# Patient Record
Sex: FEMALE | Race: WHITE | Employment: OTHER | URBAN - METROPOLITAN AREA
[De-identification: names, ages, dates, MRNs, and addresses within clinical notes are randomized per-mention and may not be internally consistent; named-entity substitution may affect disease eponyms.]

---

## 2019-02-18 ENCOUNTER — APPOINTMENT (OUTPATIENT)
Dept: GENERAL RADIOLOGY | Facility: HOSPITAL | Age: 84
DRG: 064 | End: 2019-02-18
Attending: EMERGENCY MEDICINE
Payer: MEDICARE

## 2019-02-18 ENCOUNTER — HOSPITAL ENCOUNTER (INPATIENT)
Facility: HOSPITAL | Age: 84
LOS: 3 days | Discharge: NURSING, REHAB OR LONG-TERM CARE | DRG: 064 | End: 2019-02-21
Attending: EMERGENCY MEDICINE | Admitting: FAMILY MEDICINE
Payer: MEDICARE

## 2019-02-18 DIAGNOSIS — N30.90 CYSTITIS: ICD-10-CM

## 2019-02-18 DIAGNOSIS — R53.1 WEAKNESS: Primary | ICD-10-CM

## 2019-02-18 DIAGNOSIS — E86.0 DEHYDRATION: ICD-10-CM

## 2019-02-18 LAB
ANION GAP SERPL CALC-SCNC: 8 MMOL/L (ref 0–18)
BASOPHILS # BLD AUTO: 0.04 X10(3) UL (ref 0–0.2)
BASOPHILS NFR BLD AUTO: 0.5 %
BILIRUB UR QL: NEGATIVE
BUN BLD-MCNC: 39 MG/DL (ref 7–18)
BUN/CREAT SERPL: 22.8 (ref 10–20)
CALCIUM BLD-MCNC: 9.2 MG/DL (ref 8.5–10.1)
CHLORIDE SERPL-SCNC: 107 MMOL/L (ref 98–107)
CO2 SERPL-SCNC: 26 MMOL/L (ref 21–32)
COLOR UR: YELLOW
CREAT BLD-MCNC: 1.71 MG/DL (ref 0.55–1.02)
DEPRECATED RDW RBC AUTO: 46.7 FL (ref 35.1–46.3)
EOSINOPHIL # BLD AUTO: 0.08 X10(3) UL (ref 0–0.7)
EOSINOPHIL NFR BLD AUTO: 1 %
ERYTHROCYTE [DISTWIDTH] IN BLOOD BY AUTOMATED COUNT: 13.2 % (ref 11–15)
GLUCOSE BLD-MCNC: 99 MG/DL (ref 70–99)
GLUCOSE UR-MCNC: NEGATIVE MG/DL
HCT VFR BLD AUTO: 41.1 % (ref 35–48)
HGB BLD-MCNC: 13.2 G/DL (ref 12–16)
IMM GRANULOCYTES # BLD AUTO: 0.04 X10(3) UL (ref 0–1)
IMM GRANULOCYTES NFR BLD: 0.5 %
KETONES UR-MCNC: NEGATIVE MG/DL
LYMPHOCYTES # BLD AUTO: 1.26 X10(3) UL (ref 1–4)
LYMPHOCYTES NFR BLD AUTO: 15.7 %
MCH RBC QN AUTO: 30.8 PG (ref 26–34)
MCHC RBC AUTO-ENTMCNC: 32.1 G/DL (ref 31–37)
MCV RBC AUTO: 96 FL (ref 80–100)
MONOCYTES # BLD AUTO: 0.36 X10(3) UL (ref 0.1–1)
MONOCYTES NFR BLD AUTO: 4.5 %
NEUTROPHILS # BLD AUTO: 6.24 X10 (3) UL (ref 1.5–7.7)
NEUTROPHILS # BLD AUTO: 6.24 X10(3) UL (ref 1.5–7.7)
NEUTROPHILS NFR BLD AUTO: 77.8 %
NITRITE UR QL STRIP.AUTO: NEGATIVE
OSMOLALITY SERPL CALC.SUM OF ELEC: 301 MOSM/KG (ref 275–295)
PH UR: 7 [PH] (ref 5–8)
PLATELET # BLD AUTO: 231 10(3)UL (ref 150–450)
POTASSIUM SERPL-SCNC: 3.9 MMOL/L (ref 3.5–5.1)
PROT UR-MCNC: 30 MG/DL
RBC # BLD AUTO: 4.28 X10(6)UL (ref 3.8–5.3)
RBC #/AREA URNS AUTO: <1 /HPF
SODIUM SERPL-SCNC: 141 MMOL/L (ref 136–145)
SP GR UR STRIP: 1.01 (ref 1–1.03)
UROBILINOGEN UR STRIP-ACNC: <2
VIT C UR-MCNC: NEGATIVE MG/DL
WBC # BLD AUTO: 8 X10(3) UL (ref 4–11)
WBC #/AREA URNS AUTO: 7 /HPF

## 2019-02-18 PROCEDURE — 80048 BASIC METABOLIC PNL TOTAL CA: CPT | Performed by: EMERGENCY MEDICINE

## 2019-02-18 PROCEDURE — 85025 COMPLETE CBC W/AUTO DIFF WBC: CPT | Performed by: EMERGENCY MEDICINE

## 2019-02-18 PROCEDURE — 99285 EMERGENCY DEPT VISIT HI MDM: CPT

## 2019-02-18 PROCEDURE — 81001 URINALYSIS AUTO W/SCOPE: CPT | Performed by: EMERGENCY MEDICINE

## 2019-02-18 PROCEDURE — 87186 SC STD MICRODIL/AGAR DIL: CPT | Performed by: EMERGENCY MEDICINE

## 2019-02-18 PROCEDURE — 36415 COLL VENOUS BLD VENIPUNCTURE: CPT

## 2019-02-18 PROCEDURE — 87086 URINE CULTURE/COLONY COUNT: CPT | Performed by: EMERGENCY MEDICINE

## 2019-02-18 PROCEDURE — 87088 URINE BACTERIA CULTURE: CPT | Performed by: EMERGENCY MEDICINE

## 2019-02-18 PROCEDURE — 72100 X-RAY EXAM L-S SPINE 2/3 VWS: CPT | Performed by: EMERGENCY MEDICINE

## 2019-02-18 RX ORDER — HEPARIN SODIUM 5000 [USP'U]/ML
5000 INJECTION, SOLUTION INTRAVENOUS; SUBCUTANEOUS EVERY 12 HOURS SCHEDULED
Status: DISCONTINUED | OUTPATIENT
Start: 2019-02-18 | End: 2019-02-20

## 2019-02-18 RX ORDER — 0.9 % SODIUM CHLORIDE 0.9 %
10 VIAL (ML) INJECTION AS NEEDED
Status: DISCONTINUED | OUTPATIENT
Start: 2019-02-18 | End: 2019-02-21

## 2019-02-18 RX ORDER — ATORVASTATIN CALCIUM 10 MG/1
10 TABLET, FILM COATED ORAL NIGHTLY
Status: DISCONTINUED | OUTPATIENT
Start: 2019-02-18 | End: 2019-02-21

## 2019-02-18 RX ORDER — SODIUM CHLORIDE 9 MG/ML
125 INJECTION, SOLUTION INTRAVENOUS CONTINUOUS
Status: DISCONTINUED | OUTPATIENT
Start: 2019-02-18 | End: 2019-02-18

## 2019-02-18 RX ORDER — MELATONIN
1000 DAILY
COMMUNITY

## 2019-02-18 RX ORDER — LOSARTAN POTASSIUM 50 MG/1
50 TABLET ORAL DAILY
Status: DISCONTINUED | OUTPATIENT
Start: 2019-02-18 | End: 2019-02-21

## 2019-02-18 RX ORDER — LOSARTAN POTASSIUM 50 MG/1
50 TABLET ORAL DAILY
COMMUNITY
End: 2019-03-14

## 2019-02-18 RX ORDER — ASPIRIN 325 MG
325 TABLET ORAL DAILY
Status: ON HOLD | COMMUNITY
End: 2019-02-21

## 2019-02-18 RX ORDER — CHOLECALCIFEROL (VITAMIN D3) 125 MCG
1000 CAPSULE ORAL DAILY
Status: DISCONTINUED | OUTPATIENT
Start: 2019-02-18 | End: 2019-02-21

## 2019-02-18 RX ORDER — SODIUM CHLORIDE 9 MG/ML
INJECTION, SOLUTION INTRAVENOUS CONTINUOUS
Status: DISCONTINUED | OUTPATIENT
Start: 2019-02-18 | End: 2019-02-20

## 2019-02-18 RX ORDER — ATORVASTATIN CALCIUM 10 MG/1
10 TABLET, FILM COATED ORAL NIGHTLY
COMMUNITY

## 2019-02-18 RX ORDER — ASPIRIN 325 MG
325 TABLET ORAL DAILY
Status: DISCONTINUED | OUTPATIENT
Start: 2019-02-18 | End: 2019-02-21

## 2019-02-18 NOTE — ED NOTES
Orders for admission, patient is aware of plan and ready to go upstairs. Any questions, please call ED RN Delmy  at extension [phone].

## 2019-02-18 NOTE — ED PROVIDER NOTES
Patient Seen in: [facility] AND [facility] Emergency Department    History   No chief complaint on file. Stated Complaint: Back pain     HPI    Patient is an 26-year-old female who was brought to the ER by her family. She complains of back pain.   Family stat Oriented to person, place, and time. Appears frail. [place] HEENT:   Head: Normocephalic and atraumatic.    Right Ear: External ear normal.   Left Ear: External ear normal.   Nose: Nose normal.   Mouth/Throat: Oropharynx is clear and moist.   Eyes: Conjunctivae Narrative: The following orders were created for panel order CBC WITH DIFFERENTIAL WITH PLATELET.   Procedure                               Abnormality         Status                     ---------                               -----------         ------

## 2019-02-19 ENCOUNTER — APPOINTMENT (OUTPATIENT)
Dept: MRI IMAGING | Facility: HOSPITAL | Age: 84
DRG: 064 | End: 2019-02-19
Attending: Other
Payer: MEDICARE

## 2019-02-19 ENCOUNTER — APPOINTMENT (OUTPATIENT)
Dept: ULTRASOUND IMAGING | Facility: HOSPITAL | Age: 84
DRG: 064 | End: 2019-02-19
Attending: FAMILY MEDICINE
Payer: MEDICARE

## 2019-02-19 LAB
ALBUMIN SERPL-MCNC: 2.3 G/DL (ref 3.4–5)
ALBUMIN/GLOB SERPL: 0.6 {RATIO} (ref 1–2)
ALP LIVER SERPL-CCNC: 198 U/L (ref 55–142)
ALT SERPL-CCNC: 23 U/L (ref 13–56)
ANION GAP SERPL CALC-SCNC: 8 MMOL/L (ref 0–18)
AST SERPL-CCNC: 63 U/L (ref 15–37)
BASOPHILS # BLD AUTO: 0.06 X10(3) UL (ref 0–0.2)
BASOPHILS NFR BLD AUTO: 0.9 %
BILIRUB SERPL-MCNC: 0.6 MG/DL (ref 0.1–2)
BUN BLD-MCNC: 37 MG/DL (ref 7–18)
BUN/CREAT SERPL: 25.5 (ref 10–20)
CALCIUM BLD-MCNC: 8.5 MG/DL (ref 8.5–10.1)
CHLORIDE SERPL-SCNC: 109 MMOL/L (ref 98–107)
CHOLEST SMN-MCNC: 139 MG/DL (ref ?–200)
CO2 SERPL-SCNC: 24 MMOL/L (ref 21–32)
CREAT BLD-MCNC: 1.45 MG/DL (ref 0.55–1.02)
DEPRECATED RDW RBC AUTO: 47.5 FL (ref 35.1–46.3)
EOSINOPHIL # BLD AUTO: 0.12 X10(3) UL (ref 0–0.7)
EOSINOPHIL NFR BLD AUTO: 1.7 %
ERYTHROCYTE [DISTWIDTH] IN BLOOD BY AUTOMATED COUNT: 13.3 % (ref 11–15)
GLOBULIN PLAS-MCNC: 4.1 G/DL (ref 2.8–4.4)
GLUCOSE BLD-MCNC: 67 MG/DL (ref 70–99)
HCT VFR BLD AUTO: 36.2 % (ref 35–48)
HDLC SERPL-MCNC: 41 MG/DL (ref 40–59)
HGB BLD-MCNC: 11.4 G/DL (ref 12–16)
IMM GRANULOCYTES # BLD AUTO: 0.06 X10(3) UL (ref 0–1)
IMM GRANULOCYTES NFR BLD: 0.9 %
LDLC SERPL CALC-MCNC: 75 MG/DL (ref ?–100)
LYMPHOCYTES # BLD AUTO: 1.33 X10(3) UL (ref 1–4)
LYMPHOCYTES NFR BLD AUTO: 19.4 %
M PROTEIN MFR SERPL ELPH: 6.4 G/DL (ref 6.4–8.2)
MCH RBC QN AUTO: 30.5 PG (ref 26–34)
MCHC RBC AUTO-ENTMCNC: 31.5 G/DL (ref 31–37)
MCV RBC AUTO: 96.8 FL (ref 80–100)
MONOCYTES # BLD AUTO: 0.43 X10(3) UL (ref 0.1–1)
MONOCYTES NFR BLD AUTO: 6.3 %
NEUTROPHILS # BLD AUTO: 4.86 X10 (3) UL (ref 1.5–7.7)
NEUTROPHILS # BLD AUTO: 4.86 X10(3) UL (ref 1.5–7.7)
NEUTROPHILS NFR BLD AUTO: 70.8 %
NONHDLC SERPL-MCNC: 98 MG/DL (ref ?–130)
OSMOLALITY SERPL CALC.SUM OF ELEC: 299 MOSM/KG (ref 275–295)
PLATELET # BLD AUTO: 216 10(3)UL (ref 150–450)
POTASSIUM SERPL-SCNC: 3.7 MMOL/L (ref 3.5–5.1)
RBC # BLD AUTO: 3.74 X10(6)UL (ref 3.8–5.3)
SODIUM SERPL-SCNC: 141 MMOL/L (ref 136–145)
T4 FREE SERPL-MCNC: 1.2 NG/DL (ref 0.8–1.7)
TRIGL SERPL-MCNC: 113 MG/DL (ref 30–149)
TSI SER-ACNC: 3.92 MIU/ML (ref 0.36–3.74)
VIT B12 SERPL-MCNC: >2000 PG/ML (ref 193–986)
WBC # BLD AUTO: 6.9 X10(3) UL (ref 4–11)

## 2019-02-19 PROCEDURE — 97530 THERAPEUTIC ACTIVITIES: CPT

## 2019-02-19 PROCEDURE — 82306 VITAMIN D 25 HYDROXY: CPT | Performed by: FAMILY MEDICINE

## 2019-02-19 PROCEDURE — 72148 MRI LUMBAR SPINE W/O DYE: CPT | Performed by: OTHER

## 2019-02-19 PROCEDURE — 76770 US EXAM ABDO BACK WALL COMP: CPT | Performed by: FAMILY MEDICINE

## 2019-02-19 PROCEDURE — 82607 VITAMIN B-12: CPT | Performed by: FAMILY MEDICINE

## 2019-02-19 PROCEDURE — 80053 COMPREHEN METABOLIC PANEL: CPT | Performed by: FAMILY MEDICINE

## 2019-02-19 PROCEDURE — 97162 PT EVAL MOD COMPLEX 30 MIN: CPT

## 2019-02-19 PROCEDURE — 80061 LIPID PANEL: CPT | Performed by: FAMILY MEDICINE

## 2019-02-19 PROCEDURE — 97535 SELF CARE MNGMENT TRAINING: CPT

## 2019-02-19 PROCEDURE — 72146 MRI CHEST SPINE W/O DYE: CPT | Performed by: OTHER

## 2019-02-19 PROCEDURE — 85025 COMPLETE CBC W/AUTO DIFF WBC: CPT | Performed by: FAMILY MEDICINE

## 2019-02-19 PROCEDURE — 97116 GAIT TRAINING THERAPY: CPT

## 2019-02-19 PROCEDURE — 70551 MRI BRAIN STEM W/O DYE: CPT | Performed by: OTHER

## 2019-02-19 PROCEDURE — 84443 ASSAY THYROID STIM HORMONE: CPT | Performed by: FAMILY MEDICINE

## 2019-02-19 PROCEDURE — 84439 ASSAY OF FREE THYROXINE: CPT | Performed by: FAMILY MEDICINE

## 2019-02-19 PROCEDURE — 97166 OT EVAL MOD COMPLEX 45 MIN: CPT

## 2019-02-19 RX ORDER — ACETAMINOPHEN 500 MG
500 TABLET ORAL EVERY 6 HOURS PRN
Status: DISCONTINUED | OUTPATIENT
Start: 2019-02-19 | End: 2019-02-21

## 2019-02-19 RX ORDER — TRAMADOL HYDROCHLORIDE 50 MG/1
50 TABLET ORAL EVERY 12 HOURS PRN
Status: DISCONTINUED | OUTPATIENT
Start: 2019-02-19 | End: 2019-02-21

## 2019-02-19 NOTE — H&P
[facility]    PATIENT'S NAME: Emeli Morales PHYSICIAN: Ale Salazar MD   PATIENT ACCOUNT#:   [id number]    LOCATION:  [address] [place] [address] Dr. Ferrara [facility] RECORD #:   [id number]       YOB: 1934  ADMISSION DATE:       02/18/20 movements were intact. Mucosa was dry. NECK:  No masses. LUNGS:  Clear to auscultation and percussion. HEART:  Regular rate and rhythm. S1 and S2. No murmurs. EXTREMITIES:  No cyanosis, clubbing, or edema. ABDOMEN:  Positive bowel sounds.   Soft, no

## 2019-02-19 NOTE — PLAN OF CARE
Problem: Patient Centered Care  Goal: Patient preferences are identified and integrated in the patient's plan of care  Interventions:  - What would you like us to know as we care for you?  I live at home with my   - Provide timely, complete, and accu MUSCULOSKELETAL - ADULT  Goal: Return mobility to safest level of function  INTERVENTIONS:  - Assess patient stability and activity tolerance for standing, transferring and ambulating w/ or w/o assistive devices  - Assist with transfers and ambulation usin

## 2019-02-19 NOTE — PROGRESS NOTES
[facility]    Progress Note    Isabel Rodriguez Patient Status:  Inpatient    1934 MRN [id number]   Location [facility] 5SW/SE Attending Francesca Montgomery MD   Hosp Day # 1 PCP Mayank Prieto MD       Subjective:   Isabel Rodriguez Specifically, no evidence of acute or early subacute infarction. 2. 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  There are additional chronic bilateral cerebellar lacun Abdominal Aorta Complete  (cpt=76770)    Result Date: 2/19/2019  CONCLUSION:  1.  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

## 2019-02-19 NOTE — CONSULTS
02/18/19  1615 02/18/19  1759 02/18/19  1801 02/18/19  2110   BP: 135/69  152/73 130/72   Pulse: 90  102 94   Resp: 18 18 18   Temp:   97.3 °F (36.3 °C) 97.6 °F (36.4 °C)   TempSrc:   Oral Oral   SpO2: 96%  100% 98%   Weight:       Height:  5' 6\" (1.676 External ear normal.   Nose: Nose normal.   Mouth/Throat: Oropharynx is clear and moist.   Eyes: Conjunctivae and EOM are normal. Pupils are equal, round, and reactive to light. Neck: Neck supple.    Back exam there is no specific point tenderness she peguero

## 2019-02-19 NOTE — CM/SW NOTE
Sw was notified by RN that recommendations are for SNF and family is requesting SNF list    SW met w/ pt to d/c. Pt states she lives at home w/  and \"some children\". Pt reported there is an elevator in her building to use.  Pt reported no DME/servi

## 2019-02-19 NOTE — PHYSICAL THERAPY NOTE
PHYSICAL THERAPY EVALUATION - INPATIENT     Room Number: 553/553-A  Evaluation Date: 2/19/2019  Type of Evaluation: Initial   Physician Order: PT Eval and Treat    Presenting Problem: weakness,cystitis and dehydration  Reason for Therapy: Mobility Dysfunc training;Neuromuscular re-educate;Strengthening;Transfer training;Balance training  Rehab Potential : Good  Frequency (Obs): 5x/week       PHYSICAL THERAPY MEDICAL/SOCIAL HISTORY     History related to current admission:   Brought to the ER by her family. +  Dynamic Standing: Poor    AD       AM-PAC '6-Clicks' INPATIENT SHORT FORM - BASIC MOBILITY  How much difficulty does the patient currently have. ..  -   Turning over in bed (including adjusting bedclothes, sheets and blankets)?: A Little   -   Sitting do Current Status    Goal #5 Patient to demonstrate independence with home activity/exercise instructions provided to patient in preparation for discharge.    Goal #5   Current Status    Goal #6    Goal #6  Current Status

## 2019-02-19 NOTE — PLAN OF CARE
Problem: Patient Centered Care  Goal: Patient preferences are identified and integrated in the patient's plan of care  Interventions:  - What would you like us to know as we care for you?  I live at home with my   - Provide timely, complete, and accu Patient is alert and oriented. No complaints of pain. Family at bedside asked to bring a list of patient's medication. Will continue to monitor.

## 2019-02-19 NOTE — OCCUPATIONAL THERAPY NOTE
OCCUPATIONAL THERAPY EVALUATION - INPATIENT     Room Number: 553/553-A  Evaluation Date: 2/19/2019  Type of Evaluation: Initial  Presenting Problem: (weakness)    Physician Order: IP Consult to Occupational Therapy  Reason for Therapy: ADL/IADL Dysfunction to [facility] chair and set up with alarm, call light, legs elevated. Discussed role of OT in acute care setting, recommendation of rehab placement and family goals, family verbalized understanding.     The patient's Approx Degree of Impairment: 56.46% has been vikas SUBJECTIVE  Patient agreeable to therapy     OCCUPATIONAL THERAPY EXAMINATION      OBJECTIVE  Precautions: Bed/chair alarm  Fall Risk: High fall risk    PAIN ASSESSMENT  Ratin          ACTIVITY TOLERANCE  Pt engages in fx mobility/tfrs and ADLs thi 56.46%  Standardized Score (AM-PAC Scale): 34.69  CMS Modifier (G-Code): CK    FUNCTIONAL TRANSFER ASSESSMENT  Supine to Sit : Minimum assistance  Sit to Stand:  Moderate assistance  Toilet Transfer: mod a   Shower Transfer: nt  Chair Transfer: mod a     Be

## 2019-02-20 ENCOUNTER — APPOINTMENT (OUTPATIENT)
Dept: ULTRASOUND IMAGING | Facility: HOSPITAL | Age: 84
DRG: 064 | End: 2019-02-20
Attending: FAMILY MEDICINE
Payer: MEDICARE

## 2019-02-20 LAB
25(OH)D3 SERPL-MCNC: 18.2 NG/ML (ref 30–100)
ANION GAP SERPL CALC-SCNC: 6 MMOL/L (ref 0–18)
BASOPHILS # BLD AUTO: 0.06 X10(3) UL (ref 0–0.2)
BASOPHILS NFR BLD AUTO: 0.8 %
BUN BLD-MCNC: 28 MG/DL (ref 7–18)
BUN/CREAT SERPL: 23.9 (ref 10–20)
CALCIUM BLD-MCNC: 8.5 MG/DL (ref 8.5–10.1)
CHLORIDE SERPL-SCNC: 111 MMOL/L (ref 98–107)
CO2 SERPL-SCNC: 24 MMOL/L (ref 21–32)
CREAT BLD-MCNC: 1.17 MG/DL (ref 0.55–1.02)
DEPRECATED RDW RBC AUTO: 46.5 FL (ref 35.1–46.3)
EOSINOPHIL # BLD AUTO: 0.16 X10(3) UL (ref 0–0.7)
EOSINOPHIL NFR BLD AUTO: 2.2 %
ERYTHROCYTE [DISTWIDTH] IN BLOOD BY AUTOMATED COUNT: 13.1 % (ref 11–15)
GLUCOSE BLD-MCNC: 74 MG/DL (ref 70–99)
HCT VFR BLD AUTO: 37 % (ref 35–48)
HGB BLD-MCNC: 11.8 G/DL (ref 12–16)
IMM GRANULOCYTES # BLD AUTO: 0.05 X10(3) UL (ref 0–1)
IMM GRANULOCYTES NFR BLD: 0.7 %
LYMPHOCYTES # BLD AUTO: 1.15 X10(3) UL (ref 1–4)
LYMPHOCYTES NFR BLD AUTO: 15.6 %
MCH RBC QN AUTO: 31.3 PG (ref 26–34)
MCHC RBC AUTO-ENTMCNC: 31.9 G/DL (ref 31–37)
MCV RBC AUTO: 98.1 FL (ref 80–100)
MONOCYTES # BLD AUTO: 0.36 X10(3) UL (ref 0.1–1)
MONOCYTES NFR BLD AUTO: 4.9 %
NEUTROPHILS # BLD AUTO: 5.6 X10 (3) UL (ref 1.5–7.7)
NEUTROPHILS # BLD AUTO: 5.6 X10(3) UL (ref 1.5–7.7)
NEUTROPHILS NFR BLD AUTO: 75.8 %
OSMOLALITY SERPL CALC.SUM OF ELEC: 296 MOSM/KG (ref 275–295)
PLATELET # BLD AUTO: 216 10(3)UL (ref 150–450)
POTASSIUM SERPL-SCNC: 3.5 MMOL/L (ref 3.5–5.1)
RBC # BLD AUTO: 3.77 X10(6)UL (ref 3.8–5.3)
SODIUM SERPL-SCNC: 141 MMOL/L (ref 136–145)
WBC # BLD AUTO: 7.4 X10(3) UL (ref 4–11)

## 2019-02-20 PROCEDURE — 85025 COMPLETE CBC W/AUTO DIFF WBC: CPT | Performed by: FAMILY MEDICINE

## 2019-02-20 PROCEDURE — 76705 ECHO EXAM OF ABDOMEN: CPT | Performed by: FAMILY MEDICINE

## 2019-02-20 PROCEDURE — 80048 BASIC METABOLIC PNL TOTAL CA: CPT | Performed by: FAMILY MEDICINE

## 2019-02-20 RX ORDER — DEXTROSE, SODIUM CHLORIDE, AND POTASSIUM CHLORIDE 5; .9; .15 G/100ML; G/100ML; G/100ML
INJECTION INTRAVENOUS CONTINUOUS
Status: DISCONTINUED | OUTPATIENT
Start: 2019-02-20 | End: 2019-02-21

## 2019-02-20 NOTE — PROGRESS NOTES
Notified Dr. Yeison Dick Potassium level asked if he wanted to initiate protocol, he did not want it at this time.

## 2019-02-20 NOTE — PROGRESS NOTES
02/19/19  0802 02/19/19  1215 02/19/19  1530 02/19/19 2009   BP: 139/71 127/72 133/61 119/68   Pulse: 87 74 81 82   Resp: 18 18 18 18   Temp: 98.1 °F (36.7 °C) 98.2 °F (36.8 °C) 98 °F (36.7 °C) 98.4 °F (36.9 °C)   TempSrc: Oral Oral Oral Oral   SpO2: 96%

## 2019-02-20 NOTE — PROGRESS NOTES
[facility]    Progress Note    Oracio Blas Patient Status:  Inpatient    1934 MRN [id number]   Location [facility] [place] 5SW/SE Attending Cathy Tolentino MD   Hosp Day # 2 PCP Jacki Ardon MD       Subjective:   Oracio Blas TSH 3.920 (H) 02/19/2019    B12 >2,000 (H) 02/19/2019       No procedure found. Us Liver (cpt=76705)    Result Date: 2/20/2019  CONCLUSION:  1. Very limited study. 2. Diffusely abnormal echotexture of the liver.  3. At least 2 echogenic densities in t at T5 and T6. 3. Scattered mild thoracic spine degenerative changes. No high-grade neural compromise accounting for degree of motion limitation. 4. Moderate thoracic dextroscoliosis.  5. Probable bilateral renal cysts, incompletely characterized on this non moderate aneurysmal dilatation of the mid descending abdominal aorta measuring 4.0 x 3.0 cm in size as discussed with the moderate amount of peripheral thrombus noted anteriorly. Correlate clinically.  Followup CT scanning with contrast would be of help to

## 2019-02-20 NOTE — CM/SW NOTE
SW was notified that pt/family requesting [address] SNF.  SW placed referral.    [phone]

## 2019-02-20 NOTE — PROGRESS NOTES
This RN was not aware pharmacy had changed patients dose to 1 gm, RN thought she entered order in wrong so changed the medication back to 500 mg. Spoke with pharmacist to fix dosing back to 1 gm per patient needs/dosing.

## 2019-02-20 NOTE — PLAN OF CARE
Problem: Patient/Family Goals  Goal: Patient/Family Long Term Goal  Patient's Long Term Goal: to go home    Interventions:  -follow MD instruction  -Monitor Labs  -Monitor VS  - See additional Care Plan goals for specific interventions   Outcome: Progressi given PRN for back pain. MD notified that patient wishes to have DNR code status. Will continue to monitor.

## 2019-02-20 NOTE — DIETARY NOTE
ADULT NUTRITION INITIAL ASSESSMENT    Pt is at high nutrition risk. Pt meets malnutrition criteria.       CRITERIA FOR MALNUTRITION DIAGNOSIS:  Criteria for severe malnutrition diagnosis: social/environmental related to body fat severe depletion and muscle determined    ADMITTING DIAGNOSIS:   Dehydration [E86.0]  Weakness [R53.1]  Cystitis [N30.90]    PERTINENT PAST MEDICAL HISTORY:   Past Medical History:   Diagnosis Date   • Back pain        ANTHROPOMETRICS:  HT: 167.6 cm (5' 6\")  WT: 49.9 kg (110 lb)   B (1-1.5 grams protein per kg Actual body wt (ABW))    MONITOR AND EVALUATE/NUTRITION GOALS:  - Food and Nutrient Intake:      Monitor: adequacy of PO intake and adequacy of supplement intake.     - Food and Nutrient Administration:      Monitor: need for nut

## 2019-02-20 NOTE — PLAN OF CARE
Problem: Patient Centered Care  Goal: Patient preferences are identified and integrated in the patient's plan of care  Interventions:  - What would you like us to know as we care for you?  I live at home with my   - Provide timely, complete, and accu strengthening/mobility  - Encourage toileting schedule  Outcome: Progressing  Call light and belongings within reach, up in the chair at meal times (attempting to)    Problem: MUSCULOSKELETAL - ADULT  Goal: Return mobility to safest level of function  INTE

## 2019-02-20 NOTE — PROGRESS NOTES
[facility] Pharmacy Note:  Renal Adjustment for Ampicillin     Frandy Walters is a 80year old female who has been prescribed ampicillin 500 mg every 4 hrs. CrCl is estimated creatinine clearance is 28.2 mL/min (A) (based on SCr of 1.17 mg/dL (H)).  The dose has b

## 2019-02-21 VITALS
TEMPERATURE: 98 F | RESPIRATION RATE: 18 BRPM | WEIGHT: 110 LBS | SYSTOLIC BLOOD PRESSURE: 158 MMHG | BODY MASS INDEX: 17.68 KG/M2 | HEART RATE: 93 BPM | HEIGHT: 66 IN | DIASTOLIC BLOOD PRESSURE: 86 MMHG | OXYGEN SATURATION: 96 %

## 2019-02-21 RX ORDER — SODIUM CHLORIDE 9 MG/ML
INJECTION, SOLUTION INTRAVENOUS
Status: COMPLETED
Start: 2019-02-21 | End: 2019-02-21

## 2019-02-21 RX ORDER — ACETAMINOPHEN 500 MG
500 TABLET ORAL EVERY 6 HOURS PRN
Qty: 60 TABLET | Refills: 0 | Status: SHIPPED | OUTPATIENT
Start: 2019-02-21

## 2019-02-21 RX ORDER — CIPROFLOXACIN 250 MG/1
250 TABLET, FILM COATED ORAL 2 TIMES DAILY
Qty: 14 TABLET | Refills: 0 | Status: SHIPPED | OUTPATIENT
Start: 2019-02-21 | End: 2019-02-28

## 2019-02-21 NOTE — PROGRESS NOTES
[facility]    Progress Note    Oracio Blas Patient Status:  Inpatient    1934 MRN [id number]   Location [facility] 5SW/SE Attending Cathy Tolentino MD   Hosp Day # 3 PCP Jacki Ardon MD       Subjective:   Oracio Blas 02/20/2019    .0 02/20/2019    CREATSERUM 1.17 (H) 02/20/2019    BUN 28 (H) 02/20/2019     02/20/2019    K 3.5 02/20/2019     (H) 02/20/2019    CO2 24.0 02/20/2019    GLU 74 02/20/2019    CA 8.5 02/20/2019    ALB 2.3 (L) 02/19/2019    AL

## 2019-02-21 NOTE — PROGRESS NOTES
02/20/19  0549 02/20/19  0754 02/20/19  1632 02/20/19 2006   BP: 156/80 143/67 141/75 124/58   Pulse: 78 83 86 103   Resp: 16 18 18 16   Temp: 97.5 °F (36.4 °C) 97.7 °F (36.5 °C) 98.2 °F (36.8 °C) 98.2 °F (36.8 °C)   TempSrc: Axillary Oral Oral Oral   Sp

## 2019-02-21 NOTE — CM/SW NOTE
Pt able to d/c today. Family requesting Medicar. SW notified [facility]; agreeable w/ 2p d/c time  SW notified RN  Pt/family aware of d/c time and Medicar (51$)  JEFFREY contacted [facility] for [address] PCS form on chart.     [facility] # [phone]    Matt Thompson,

## 2019-02-21 NOTE — CDS QUERY
Potential for Impaired Nutritional Status  DOCUMENTATION CLARIFICATION FORM  Dear Doctor:  Clinical information suggests potential for impaired nutritional status.  For accurate ICD-10-CM code assignment to reflect severity of illness and risk of mortality, INTERVENTION:  - RD Malnutrition Care Plan: Encouraged increased PO intake, Initiated ONS (oral nutritional supplements) and Recommend appetitie stimulant     - Meals and snacks: Encouraged adequate PO intake     - Medical Food Supplements-RD changed suppl

## 2019-02-21 NOTE — PLAN OF CARE
Problem: Patient Centered Care  Goal: Patient preferences are identified and integrated in the patient's plan of care  Interventions:  - What would you like us to know as we care for you?  I live at home with my   - Provide timely, complete, and accu activity based on assessment  - Modify environment to reduce risk of injury  - Provide assistive devices as appropriate  - Consider OT/PT consult to assist with strengthening/mobility  - Encourage toileting schedule  Outcome: Progressing      Problem: MUSC

## 2019-02-21 NOTE — DISCHARGE SUMMARY
[facility]    PATIENT'S NAME: Dottie Castrejon PHYSICIAN: Ede Gomes MD   PATIENT ACCOUNT#:   [id number]    LOCATION:  [address] [place] [address] Dr. Ferrara [facility] RECORD #:   [id number]       YOB: 1934  ADMISSION DATE:       02/18/20 pain.  No cough. No fevers. No leg pain. No sore throat. No headaches. No dysuria. No rashes. No melena, no hematochezia.       PHYSICAL EXAMINATION:    VITAL SIGNS:  At the time of admission blood pressure 152/73, pulse 102, respirations 18, tempera

## 2019-02-21 NOTE — PLAN OF CARE
Problem: Patient Centered Care  Goal: Patient preferences are identified and integrated in the patient's plan of care  Interventions:  - What would you like us to know as we care for you?  I live at home with my   - Provide timely, complete, and accu schedule  Outcome: Adequate for Discharge      Problem: MUSCULOSKELETAL - ADULT  Goal: Return mobility to safest level of function  INTERVENTIONS:  - Assess patient stability and activity tolerance for standing, transferring and ambulating w/ or w/o assist

## 2019-02-25 ENCOUNTER — INITIAL APN SNF VISIT (OUTPATIENT)
Dept: INTERNAL MEDICINE CLINIC | Facility: SKILLED NURSING FACILITY | Age: 84
End: 2019-02-25

## 2019-02-25 DIAGNOSIS — R63.8 DECREASED ORAL INTAKE: ICD-10-CM

## 2019-02-25 DIAGNOSIS — N30.90 CYSTITIS: ICD-10-CM

## 2019-02-25 DIAGNOSIS — E46 MALNUTRITION, UNSPECIFIED TYPE (HCC): ICD-10-CM

## 2019-02-25 DIAGNOSIS — I95.1 ORTHOSTATIC HYPOTENSION: ICD-10-CM

## 2019-02-25 DIAGNOSIS — E86.0 DEHYDRATION: ICD-10-CM

## 2019-02-25 DIAGNOSIS — R53.1 WEAKNESS: ICD-10-CM

## 2019-02-25 PROCEDURE — 99310 SBSQ NF CARE HIGH MDM 45: CPT | Performed by: CLINICAL NURSE SPECIALIST

## 2019-02-26 ENCOUNTER — SNF VISIT (OUTPATIENT)
Dept: INTERNAL MEDICINE CLINIC | Facility: SKILLED NURSING FACILITY | Age: 84
End: 2019-02-26

## 2019-02-26 DIAGNOSIS — Z09 FOLLOW UP: ICD-10-CM

## 2019-02-26 DIAGNOSIS — I95.1 ORTHOSTATIC HYPOTENSION: ICD-10-CM

## 2019-02-26 DIAGNOSIS — Z51.89 ENCOUNTER FOR MEDICATION ADJUSTMENT: ICD-10-CM

## 2019-02-26 PROCEDURE — 99309 SBSQ NF CARE MODERATE MDM 30: CPT | Performed by: NURSE PRACTITIONER

## 2019-02-26 NOTE — PROGRESS NOTES
HPI: Ernesto Tavares  : 1934  Age 80year old  female patient is admitted to [facility] for [facility]    Chief complaint: Orthostatic hypotension, Initial APRN assessment and f/u UTI, dehydration, lacunar cerebellar infarcts, chronic her breakfast today. Family sts that pt was drinking ensure shakes at some point and seemed to like them. She continues to be on abx for UTI. Discussed w/ pt and family a plan to monitor orthistatic VS q shift x 3 days to assess for trend and symptoms. monitor labs    3. New acute R microhemorrhage of the cerebellum   - Seen by Dr. Randall Rodriguez   - MRI brain w/ extensive ischemic changes   - CPM statin   - ASA stopped in hospital    4.  Lacunar cerebellar infarcts   - seen by Dr. Randall Rodriguez   - CPm statin   - CPM t

## 2019-02-26 NOTE — PROGRESS NOTES
HPI: Gogo Denton  : 1934  Age 80year old  female patient is admitted to [facility] for [facility]    Chief complaint: Follow up Orthostatic hypotension    HPI-This is a(n) 80year old female who presented to [facility] on  w/ dami BP measurement Q shift and will order TEDs. Past Medical History:   Diagnosis Date   • Back pain      No past surgical history on file. No family history on file.   Social History    Tobacco Use      Smoking status: Current Every Day Smoker      Smoke MRI   - US of liver at [facility] done   - family declining further work up    6. Infrarenal aortic aneurysm, 3.7 cm   - monitor    7.   Chronic ataxia  - likely due to extensive ischemic changes as seen on MRI brain  - seen by Dr. Trice Cerda at [address]

## 2019-02-27 ENCOUNTER — HOSPITAL (OUTPATIENT)
Dept: OTHER | Age: 84
End: 2019-02-27

## 2019-02-27 ENCOUNTER — SNF VISIT (OUTPATIENT)
Dept: INTERNAL MEDICINE CLINIC | Facility: SKILLED NURSING FACILITY | Age: 84
End: 2019-02-27

## 2019-02-27 DIAGNOSIS — N30.90 CYSTITIS: ICD-10-CM

## 2019-02-27 DIAGNOSIS — R53.1 WEAKNESS: ICD-10-CM

## 2019-02-27 DIAGNOSIS — E86.0 DEHYDRATION: ICD-10-CM

## 2019-02-27 DIAGNOSIS — R00.0 TACHYCARDIA: ICD-10-CM

## 2019-02-27 LAB
A/G RATIO_: 0.6
ABS LYMPH: 0.8 K/CUMM (ref 1–3.5)
ABS MONO: 0.4 K/CUMM (ref 0.1–0.8)
ABS NEUTRO: 7.2 K/CUMM (ref 2–8)
ALBUMIN: 2.5 G/DL (ref 3.5–5)
ALK PHOS: 268 UNIT/L (ref 50–124)
ALT/GPT: 48 UNIT/L (ref 0–55)
ANION GAP SERPL CALC-SCNC: 19 MEQ/L (ref 10–20)
AST/GOT: 79 UNIT/L (ref 5–34)
BASOPHIL: 0 % (ref 0–1)
BILI TOTAL: 0.7 MG/DL (ref 0.2–1)
BUN SERPL-MCNC: 45 MG/DL (ref 6–20)
CALCIUM: 8.8 MG/DL (ref 8.4–10.2)
CHLORIDE: 104 MEQ/L (ref 97–107)
CREATININE: 1.72 MG/DL (ref 0.6–1.3)
DIFF_TYPE?: ABNORMAL
EOSINOPHIL: 0 % (ref 0–6)
GLOBULIN_: 3.9 G/DL (ref 2–4.1)
GLUCOSE LVL: 114 MG/DL (ref 70–99)
HCT VFR BLD CALC: 39 % (ref 33–45)
HEMOLYSIS 2+: ABNORMAL
HGB BLD-MCNC: 12.3 G/DL (ref 11–15)
IMMATURE GRAN: 0.6 % (ref 0–0.3)
INSTR WBC: 8.5 K/CUMM (ref 4–11)
LIPEMIC 3+: NEGATIVE
LYMPHOCYTE: 9 %
MCH RBC QN AUTO: 31 PG (ref 25–35)
MCHC RBC AUTO-ENTMCNC: 31 G/DL (ref 32–37)
MCV RBC AUTO: 98 FL (ref 78–97)
MONOCYTE: 5 %
NEUTROPHIL: 84 %
NRBC BLD MANUAL-RTO: 0 % (ref 0–0.2)
PLATELET: 283 K/CUMM (ref 150–450)
POTASSIUM: 4.5 MEQ/L (ref 3.5–5.1)
RBC # BLD: 4 M/CUMM (ref 3.7–5.2)
RDW: 13.9 % (ref 11.5–14.5)
SODIUM: 137 MEQ/L (ref 136–145)
TCO2: 19 MEQ/L (ref 19–29)
TOTAL PROTEIN: 6.4 G/DL (ref 6.4–8.3)
WBC # BLD: 8.5 K/CUMM (ref 4–11)

## 2019-02-27 PROCEDURE — 99308 SBSQ NF CARE LOW MDM 20: CPT | Performed by: CLINICAL NURSE SPECIALIST

## 2019-02-28 NOTE — PROGRESS NOTES
HPI: Nils Rodriguez : 1934 Age 80year old female patient is admitted to [facility] for [facility]     Chief complaint: Tachycardia, Dehydration, Follow up Orthostatic hypotension, weakness     HPI-This is a(n) 80year old female who Tobacco Use   Smoking status: Current Every Day Smoker   Smokeless tobacco: Never Used   Alcohol use: Not on file   Drug use: Not on file   ALLERGIES:   No Known Allergies   CODE STATUS: DNR   CURRENT MEDICATIONS: Reviewed on SNF EMR   VITALS: Reviewed   L 12   - US of liver at [address] [place] [address] done   - family declining further work up   6. Infrarenal aortic aneurysm, 3.7 cm   - monitor   7.  Chronic ataxia   - likely due to extensive ischemic changes as seen on MRI brain   - seen by Dr. Jorden Couch at [place] [address]

## 2019-03-04 ENCOUNTER — SNF VISIT (OUTPATIENT)
Dept: INTERNAL MEDICINE CLINIC | Facility: SKILLED NURSING FACILITY | Age: 84
End: 2019-03-04

## 2019-03-04 DIAGNOSIS — Z09 FOLLOW UP: ICD-10-CM

## 2019-03-04 DIAGNOSIS — R63.8 DECREASED ORAL INTAKE: ICD-10-CM

## 2019-03-04 DIAGNOSIS — E86.0 DEHYDRATION: ICD-10-CM

## 2019-03-04 DIAGNOSIS — R53.1 WEAKNESS: ICD-10-CM

## 2019-03-04 DIAGNOSIS — R62.51 FAILURE TO THRIVE (0-17): ICD-10-CM

## 2019-03-04 PROCEDURE — 99308 SBSQ NF CARE LOW MDM 20: CPT | Performed by: CLINICAL NURSE SPECIALIST

## 2019-03-06 ENCOUNTER — SNF VISIT (OUTPATIENT)
Dept: INTERNAL MEDICINE CLINIC | Facility: SKILLED NURSING FACILITY | Age: 84
End: 2019-03-06

## 2019-03-06 DIAGNOSIS — R53.1 WEAKNESS: ICD-10-CM

## 2019-03-06 DIAGNOSIS — R62.51 FAILURE TO THRIVE (0-17): ICD-10-CM

## 2019-03-06 DIAGNOSIS — R63.0 POOR APPETITE: ICD-10-CM

## 2019-03-06 DIAGNOSIS — E87.0 HYPERNATREMIA: ICD-10-CM

## 2019-03-06 DIAGNOSIS — E86.0 DEHYDRATION: Primary | ICD-10-CM

## 2019-03-06 DIAGNOSIS — R79.89 AZOTEMIA: ICD-10-CM

## 2019-03-06 PROCEDURE — 99310 SBSQ NF CARE HIGH MDM 45: CPT | Performed by: NURSE PRACTITIONER

## 2019-03-07 ENCOUNTER — SNF VISIT (OUTPATIENT)
Dept: INTERNAL MEDICINE CLINIC | Facility: SKILLED NURSING FACILITY | Age: 84
End: 2019-03-07

## 2019-03-07 DIAGNOSIS — E87.0 HYPERNATREMIA: ICD-10-CM

## 2019-03-07 DIAGNOSIS — R53.1 WEAKNESS: ICD-10-CM

## 2019-03-07 DIAGNOSIS — Z02.9 DISCHARGE PLANNING ISSUES: ICD-10-CM

## 2019-03-07 DIAGNOSIS — Z09 FOLLOW UP: ICD-10-CM

## 2019-03-07 DIAGNOSIS — E86.0 DEHYDRATION: ICD-10-CM

## 2019-03-07 DIAGNOSIS — R62.7 FAILURE TO THRIVE IN ADULT: ICD-10-CM

## 2019-03-07 DIAGNOSIS — R63.8 DECREASED ORAL INTAKE: ICD-10-CM

## 2019-03-07 PROCEDURE — 99308 SBSQ NF CARE LOW MDM 20: CPT | Performed by: CLINICAL NURSE SPECIALIST

## 2019-03-07 RX ORDER — MEGESTROL ACETATE 40 MG/ML
40 SUSPENSION ORAL DAILY
COMMUNITY

## 2019-03-07 NOTE — PROGRESS NOTES
HPI: Ernesto Chaitanya : 1934 Age 80year old female patient is admitted to [facility] for [facility]   Chief complaint: F/u Dehydration, poor oral intake, orthostatic hypotension, weakness   HPI-This is a(n) 80year old female who prese Current Every Day Smoker   Smokeless tobacco: Never Used   Alcohol use: Not on file   Drug use: Not on file   ALLERGIES:   No Known Allergies   CODE STATUS: DNR   CURRENT MEDICATIONS: Reviewed on SNF EMR   VITALS: Reviewed   LABS/Imaging: Reviewed.  Repeat CPM therapies   5. Liver lesions on MRI   - Alh phos elevated at 268   - US of liver at [address] done   - family declining further work up   6. Infrarenal aortic aneurysm, 3.7 cm   - monitor   7.  Chronic ataxia   - likely due to extensive ischemic changes as se

## 2019-03-07 NOTE — PROGRESS NOTES
HPI: Konrad Breath : 1934 Age 80year old female patient is admitted to [facility] for [facility]     Chief complaint: Failure to thrive, refusal to eat or drink, dehydration, leukocytosis and Azotemia.      HPI-This is a(n) 84 year informed NP patient and family will be having care plan meeting today and wanted to know if it was okay to discuss Palliative plans, NP encouraged SW to discuss Palliative Care at meeting 2/2 to failure to thrive,decreased appetite and refusal to eat or dr the day. BMP 3/7. PCP notified of plan of care and agreeable. Orthostatic hypotension: Offer snacks frequently and oral fluid, TEDs on at 6AM and off at [facility]. Abd binder when OOB. off losartan at this time. Check orthostatic VS q shift.  Monitor closely

## 2019-03-07 NOTE — PROGRESS NOTES
HPI: Rawland Dust : 1934 Age 80year old female patient is admitted to [facility] for [facility]   Chief complaint: Hypernatremia, discharge planning discussion, Failure to thrive, refusal to eat or drink, dehydration, leukocytosis and refusal to eat or drink fluids, verbalized understanding. Family would like to take resident home this weekend. Discussed recommendation to await until Monday to allow for UCx to result. They are agreeable.  Today Crt down to 1.4 snf BUN minimally down hours as discussed w/ Dr. Flaquito Aldridge. Continue encouraging oral fluids through the day. BMP 3/8. PCP notified of plan of care and agreeable. Orthostatic hypotension: Offer snacks frequently and oral fluid, TEDs on at 6AM and off at [facility]. Abd binder when OOB.

## 2019-03-08 ENCOUNTER — SNF VISIT (OUTPATIENT)
Dept: INTERNAL MEDICINE CLINIC | Facility: SKILLED NURSING FACILITY | Age: 84
End: 2019-03-08

## 2019-03-08 DIAGNOSIS — E86.0 DEHYDRATION: ICD-10-CM

## 2019-03-08 DIAGNOSIS — Z09 FOLLOW UP: ICD-10-CM

## 2019-03-08 DIAGNOSIS — R53.1 WEAKNESS: ICD-10-CM

## 2019-03-08 DIAGNOSIS — R79.89 AZOTEMIA: ICD-10-CM

## 2019-03-08 PROCEDURE — 99309 SBSQ NF CARE MODERATE MDM 30: CPT | Performed by: NURSE PRACTITIONER

## 2019-03-08 NOTE — PROGRESS NOTES
HPI: Raza Elena : 1934 Age 80year old female patient is admitted to [facility] for [facility]   Chief complaint: Follow up Hypernatremia,Failure to thrive, refusal to eat or drink, dehydration, leukocytosis and Azotemia.  Hyperkal another urine sample for Ucx and inform family. Family is anticipating discharge  early next week however, UCx results will take longer than expected now. Also BMP revealed hyperkalemia of 5.3, will order kayexalate.   Family agreed to Palliative Care consu the day and also discussed w/ nurse to offer fluids throughout the day . CPM IVF-D5 0.45 NS at 50ml X 24hrs more.     Leukocytosis- wbc11.90, ordered UA/UCx, preliminary results of UCx reveals contamination, nurse to obtain another urine sample for Ucx and frequent repositioning   - off load pressure   - low air loss mattress   - heel protectors while in bed   - wound care consult

## 2019-03-11 ENCOUNTER — SNF VISIT (OUTPATIENT)
Dept: INTERNAL MEDICINE CLINIC | Facility: SKILLED NURSING FACILITY | Age: 84
End: 2019-03-11

## 2019-03-11 DIAGNOSIS — Z71.89 GOALS OF CARE, COUNSELING/DISCUSSION: ICD-10-CM

## 2019-03-11 DIAGNOSIS — R62.51 FAILURE TO THRIVE (0-17): ICD-10-CM

## 2019-03-11 DIAGNOSIS — E86.0 DEHYDRATION: ICD-10-CM

## 2019-03-11 DIAGNOSIS — E87.0 HYPERNATREMIA: ICD-10-CM

## 2019-03-11 DIAGNOSIS — R53.1 WEAKNESS: ICD-10-CM

## 2019-03-11 PROCEDURE — 99310 SBSQ NF CARE HIGH MDM 45: CPT | Performed by: CLINICAL NURSE SPECIALIST

## 2019-03-12 NOTE — PROGRESS NOTES
HPI: Brennan Dixon : 1934 Age 80year old female patient is admitted to [facility] for [facility]   Chief complaint: Goals of care discussion, hypernatremia, dehydration, Follow up Failure to thrive, refusal to eat or drink,leukocyto alfa and not indicative of infection. Family is asking for discharge home tomorrow stating that they believe pt will do better at home. Labs reviewed today w/ Crt up to 1.85 (1.38), BUN 65 (56), Na 156 (152) and K WNL.  Labs discussed w/ Dr. Krysta Hallman w/ re constipation and diarrhea. Answers yes to being tired.    PHYSICAL EXAM:   GENERAL HEALTH: frail looking, tired, underweight   HEENT: atraumatic/normocephalic, mucous membranes pink   RESPIRATORY:clear to auscultation   CARDIOVASCULAR: Regular, S1, S2   ABD statin   - CPM therapies   5. Liver lesions on MRI   - Alk phos elevated at 268   - US of liver at [facility] done   - family declining further work up   6. Infrarenal aortic aneurysm, 3.7 cm   - monitor   7.  Chronic ataxia   - likelydue to extensive ischemic jozef

## 2019-03-14 ENCOUNTER — SNF VISIT (OUTPATIENT)
Dept: INTERNAL MEDICINE CLINIC | Facility: SKILLED NURSING FACILITY | Age: 84
End: 2019-03-14

## 2019-03-14 DIAGNOSIS — Z02.9 DISCHARGE PLANNING ISSUES: ICD-10-CM

## 2019-03-14 DIAGNOSIS — R53.1 WEAKNESS: ICD-10-CM

## 2019-03-14 DIAGNOSIS — E87.0 HYPERNATREMIA: ICD-10-CM

## 2019-03-14 DIAGNOSIS — Z79.899 ENCOUNTER FOR MEDICATION REVIEW: ICD-10-CM

## 2019-03-14 DIAGNOSIS — R53.81 PHYSICAL DECONDITIONING: ICD-10-CM

## 2019-03-14 DIAGNOSIS — E46 MALNUTRITION, UNSPECIFIED TYPE (HCC): ICD-10-CM

## 2019-03-14 DIAGNOSIS — E86.0 DEHYDRATION: ICD-10-CM

## 2019-03-14 DIAGNOSIS — Z91.89 AT HIGH RISK FOR IMPAIRED SKIN INTEGRITY: ICD-10-CM

## 2019-03-14 PROCEDURE — 99310 SBSQ NF CARE HIGH MDM 45: CPT | Performed by: CLINICAL NURSE SPECIALIST

## 2019-03-14 NOTE — PROGRESS NOTES
Discharge summary     HPI: Marian Cabezas : 1934 Age 80year old female patient is admitted to [facility] for [facility]     Chief complaint: Discharge education, f/u hypernatremia, dehydration, Failure to thrive, refusal to eat or dr and discussed plan w/ family w/ agreeable discharge date on 3/14 after IVF completion. Maegan Grew has been arranged for 4pm today. Pt seen today in [facility] w/ family at bedside, no acute distress or SOB noted. Patient is more alert and awake today.  She was able S2   ABDOMEN: normal active BS+, soft, non distended, nontender   LYMPHATIC:no lymphedema   MUSCULOSKELETAL: no acute synovitis upper or lower extremity   EXTREMITIES/VASCULAR:no cyanosis, clubbing or edema   LINES, TUBES, DRAINS: PIV L hand to be disconti from 3/5   - IVF restarted 3/11 x 72H: D5 0.45NS at 50ml/hr later increased to 100ml/hr on 3/12 (will complete 72H today at 1400)   9. Severe malnutrition   - albumin 2.5   - encourage oral intake   - CPM nutritional shakes TID w/ meals     10.  HTN   - Off

## (undated) NOTE — IP AVS SNAPSHOT
Patient Demographics     Address  [address] Phone  [phone] Health system)  [phone] [facility]      Emergency Contact(s)     Name Relation Home Work Raúl Villanuevaison  [phone]      Tiffanie Slater Bring a paper prescription for each of these medications  acetaminophen 500 MG Tabs  Ciprofloxacin HCl 250 MG Tabs           [id number] - MAR ACTION REPORT  (last 24 hrs)    ** SITE UNKNOWN **     Order ID Medication Name Action Time Action Reason Comments Optimal                        ng/mL       Potentially Toxic        > 100     ng/mL    Specimen Information    Type Source Collected On   Blood — 02/19/19 3876          Components    Component Value Reference Range Flag Lab   Vitamin D, 25OH, To HISTORY OF PRESENT ILLNESS:  The patient is complaining of lower back pain going on for several months, progressively getting worse, making it more and more difficult to walk.   According to the , he is needing to assist her more and more, making it BACK:  Plus-minus tenderness in the lumbosacral region. ASSESSMENT AND PLAN:    1.   Dehydration. 2.   Leukocytes in the urine, possible urinary tract infection. 3.   Chronic ataxia. 4.   Lacunar cerebellar infarct. 5.   Vitamin D deficiency.   6.  realized that the's was too difficult. She was then brought to the ER. There is been no fever no cough no falls or injuries. No abdominal pain.  states that she will not wear diapers but she is moving her bowels and urinating in bed.   He Neurological: Alert and oriented to person, place, and time. EOMI PERRL  Coord OK  Face symm   symm  Moves all extremities  DTR's depressed  Toes down      IMP/REC: I will check MRI of the thoracic and lumbar spine.  Because of history of CVA, check MRI for the the past 2 months at most. During this visit, she requires min A I sup<sit on EOB while encourage to do as much as she can. Mod/min @ PA in sit<>stand with RW as support cueing on approp hand placement and posturing.  Amb with RW  but with retropulsi Stairs to Bedroom: 0       Lives With: Spouse; Son  Drives: No     Patient Regularly Uses: Glasses    Prior Level of [place]:[TF.1] Per son she has not been amb for at least 2 months and per ER note at least 3-4 months and has been dependent with husba Gait Assessment   Gait Assistance: Maximum assistance  Distance (ft): 25  Assistive Device: Rolling walker  Pattern: Shuffle  Stoop/Curb Assistance: Not tested       Bed Mobility:[TF.1] mod/min A[TF.2]    Transfers:[TF.1] mod/min 2PA[TF.2]    Exercise/Educ Filed:  2/19/2019  2:24 PM Date of Service:  2/19/2019  2:10 PM Status:  Signed    :   Arlen Cutler OT (Occupational Therapist)       OCCUPATIONAL THERAPY EVALUATION - INPATIENT     Room Number: 553/553-A  Evaluation Date: 2/19/2019  Type of [place] grab bars used, pt with episode of incontinent BM in brief, while seated pt able to initiate clean up with set up assist, to pass wipes, however pt ultimately requiring assist for thoroughness.  Pt returned to [facility] chair and set up with alarm, call light, leg with ADLs, and 1-2 assist from bed>chair>bed. Not much walking in a few months, family reports that she has been refusing to wear a diaper and having BM and urinating in bed.  Family wants patient to go to rehab as it has been increasingly difficult to care -   Toileting, which includes using toilet, bedpan or urinal? : A Lot  -   Putting on and taking off regular upper body clothing?: A Little  -   Taking care of personal grooming such as brushing teeth?: A Little  -   Eating meals?: A Little[ST. 2]    AM-PAC

## (undated) NOTE — IP AVS SNAPSHOT
[facility]            (For Outpatient Use Only) Initial Admit Date: 2/18/2019   Inpt/Obs Admit Date: Inpt: 2/18/19 / Obs: N/A   Discharge Date:    Maximo Pouch:  [de-identified]   MRN: [de-identified]   CSN: [id number]        ENCOUNTER  Patient Class Hospital Account Financial Class: Medicare    February 21, 2019